# Patient Record
Sex: MALE | Race: WHITE | URBAN - METROPOLITAN AREA
[De-identification: names, ages, dates, MRNs, and addresses within clinical notes are randomized per-mention and may not be internally consistent; named-entity substitution may affect disease eponyms.]

---

## 2017-04-21 ENCOUNTER — NEW PATIENT (OUTPATIENT)
Dept: URBAN - METROPOLITAN AREA CLINIC 56 | Facility: CLINIC | Age: 72
End: 2017-04-21
Payer: COMMERCIAL

## 2017-04-21 PROCEDURE — 92004 COMPRE OPH EXAM NEW PT 1/>: CPT | Performed by: OPTOMETRIST

## 2017-04-21 ASSESSMENT — VISUAL ACUITY
OD: 20/25
OS: 20/20

## 2017-04-21 ASSESSMENT — KERATOMETRY
OD: 46.08
OS: 46.22

## 2017-04-21 ASSESSMENT — INTRAOCULAR PRESSURE
OS: 15
OD: 15

## 2017-07-25 ENCOUNTER — FOLLOW UP ESTABLISHED (OUTPATIENT)
Dept: URBAN - METROPOLITAN AREA CLINIC 56 | Facility: CLINIC | Age: 72
End: 2017-07-25
Payer: COMMERCIAL

## 2017-07-25 DIAGNOSIS — H25.813 COMBINED FORMS OF AGE-RELATED CATARACT, BILATERAL: Primary | ICD-10-CM

## 2017-07-25 PROCEDURE — 92004 COMPRE OPH EXAM NEW PT 1/>: CPT | Performed by: OPHTHALMOLOGY

## 2017-07-25 PROCEDURE — 92134 CPTRZ OPH DX IMG PST SGM RTA: CPT | Performed by: OPHTHALMOLOGY

## 2017-07-25 ASSESSMENT — INTRAOCULAR PRESSURE
OS: 20
OD: 19

## 2017-07-26 ENCOUNTER — FOLLOW UP ESTABLISHED (OUTPATIENT)
Dept: URBAN - METROPOLITAN AREA CLINIC 56 | Facility: CLINIC | Age: 72
End: 2017-07-26
Payer: COMMERCIAL

## 2017-07-26 PROCEDURE — 92014 COMPRE OPH EXAM EST PT 1/>: CPT | Performed by: OPTOMETRIST

## 2017-07-26 ASSESSMENT — VISUAL ACUITY
OS: 20/20
OD: 20/25

## 2017-07-26 ASSESSMENT — INTRAOCULAR PRESSURE
OS: 18
OD: 18

## 2017-07-26 ASSESSMENT — KERATOMETRY
OD: 46.09
OS: 46.09

## 2017-08-11 ENCOUNTER — Encounter (OUTPATIENT)
Dept: URBAN - METROPOLITAN AREA CLINIC 56 | Facility: CLINIC | Age: 72
End: 2017-08-11
Payer: COMMERCIAL

## 2017-08-11 DIAGNOSIS — Z01.818 ENCOUNTER FOR OTHER PREPROCEDURAL EXAMINATION: Primary | ICD-10-CM

## 2017-08-11 PROCEDURE — 99213 OFFICE O/P EST LOW 20 MIN: CPT | Performed by: PHYSICIAN ASSISTANT

## 2017-08-11 PROCEDURE — 92025 CPTRIZED CORNEAL TOPOGRAPHY: CPT | Performed by: OPHTHALMOLOGY

## 2017-08-11 RX ORDER — ASPIRIN 81 MG/1
81 MG TABLET ORAL
Qty: 0 | Refills: 0 | Status: INACTIVE
Start: 2017-08-11 | End: 2018-06-21

## 2017-08-11 RX ORDER — MULTIVITAMIN
TABLET ORAL
Qty: 0 | Refills: 0 | Status: INACTIVE
Start: 2017-08-11 | End: 2018-06-21

## 2017-08-15 ENCOUNTER — FOLLOW UP ESTABLISHED (OUTPATIENT)
Dept: URBAN - METROPOLITAN AREA CLINIC 44 | Facility: CLINIC | Age: 72
End: 2017-08-15
Payer: COMMERCIAL

## 2017-08-15 DIAGNOSIS — H25.811 COMBINED FORMS OF AGE-RELATED CATARACT, RIGHT EYE: Primary | ICD-10-CM

## 2017-08-15 DIAGNOSIS — H25.812 COMBINED FORMS OF AGE-RELATED CATARACT, LEFT EYE: ICD-10-CM

## 2017-08-15 PROCEDURE — 99213 OFFICE O/P EST LOW 20 MIN: CPT | Performed by: OPHTHALMOLOGY

## 2017-08-22 ENCOUNTER — SURGERY (OUTPATIENT)
Dept: URBAN - METROPOLITAN AREA SURGERY 19 | Facility: SURGERY | Age: 72
End: 2017-08-22
Payer: COMMERCIAL

## 2017-08-22 PROCEDURE — 66984 XCAPSL CTRC RMVL W/O ECP: CPT | Performed by: OPHTHALMOLOGY

## 2017-08-23 ENCOUNTER — POST OP (OUTPATIENT)
Dept: URBAN - METROPOLITAN AREA CLINIC 56 | Facility: CLINIC | Age: 72
End: 2017-08-23

## 2017-08-23 PROCEDURE — 99024 POSTOP FOLLOW-UP VISIT: CPT | Performed by: OPTOMETRIST

## 2017-08-23 ASSESSMENT — INTRAOCULAR PRESSURE: OD: 18

## 2017-08-30 ENCOUNTER — POST OP (OUTPATIENT)
Dept: URBAN - METROPOLITAN AREA CLINIC 56 | Facility: CLINIC | Age: 72
End: 2017-08-30

## 2017-08-30 PROCEDURE — 99024 POSTOP FOLLOW-UP VISIT: CPT | Performed by: OPTOMETRIST

## 2017-08-30 ASSESSMENT — VISUAL ACUITY
OS: 20/25
OD: 20/20

## 2017-08-30 ASSESSMENT — INTRAOCULAR PRESSURE
OS: 18
OD: 18

## 2017-09-05 ENCOUNTER — SURGERY (OUTPATIENT)
Dept: URBAN - METROPOLITAN AREA SURGERY 19 | Facility: SURGERY | Age: 72
End: 2017-09-05
Payer: COMMERCIAL

## 2017-09-05 PROCEDURE — 66984 XCAPSL CTRC RMVL W/O ECP: CPT | Performed by: OPHTHALMOLOGY

## 2017-09-06 ENCOUNTER — POST OP (OUTPATIENT)
Dept: URBAN - METROPOLITAN AREA CLINIC 56 | Facility: CLINIC | Age: 72
End: 2017-09-06

## 2017-09-06 PROCEDURE — 99024 POSTOP FOLLOW-UP VISIT: CPT | Performed by: OPTOMETRIST

## 2017-09-06 ASSESSMENT — INTRAOCULAR PRESSURE: OS: 10

## 2017-10-03 ENCOUNTER — POST OP (OUTPATIENT)
Dept: URBAN - METROPOLITAN AREA CLINIC 56 | Facility: CLINIC | Age: 72
End: 2017-10-03

## 2017-10-03 PROCEDURE — 92015 DETERMINE REFRACTIVE STATE: CPT | Performed by: OPTOMETRIST

## 2017-10-03 PROCEDURE — 99024 POSTOP FOLLOW-UP VISIT: CPT | Performed by: OPTOMETRIST

## 2017-10-03 RX ORDER — LATANOPROST 50 UG/ML
0.005 % SOLUTION OPHTHALMIC
Qty: 1 | Refills: 0 | Status: INACTIVE
Start: 2017-10-03 | End: 2018-06-20

## 2017-10-03 ASSESSMENT — VISUAL ACUITY
OD: 20/20
OS: 20/20

## 2017-10-03 ASSESSMENT — INTRAOCULAR PRESSURE
OS: 29
OD: 14

## 2018-06-21 ENCOUNTER — OFFICE VISIT (OUTPATIENT)
Dept: URBAN - METROPOLITAN AREA CLINIC 48 | Facility: CLINIC | Age: 73
End: 2018-06-21
Payer: COMMERCIAL

## 2018-06-21 PROCEDURE — 92012 INTRM OPH EXAM EST PATIENT: CPT | Performed by: OPHTHALMOLOGY

## 2018-06-21 ASSESSMENT — INTRAOCULAR PRESSURE
OD: 16
OS: 18

## 2018-06-21 NOTE — IMPRESSION/PLAN
Impression: Ocular hypertension, left eye: H40.052. Plan: Discussed and reviewed diagnosis with patient today, understood by patient, Ocular Hypertension, intraocular pressure at/close to target with medication. Continue with medication and observe, will continue to monitor.  Patient to continue Alphagan p bid os and dorzolamide-timolol bid os

## 2020-03-06 ENCOUNTER — FOLLOW UP ESTABLISHED (OUTPATIENT)
Dept: URBAN - METROPOLITAN AREA CLINIC 60 | Facility: CLINIC | Age: 75
End: 2020-03-06
Payer: COMMERCIAL

## 2020-03-06 DIAGNOSIS — Z96.1 PRESENCE OF INTRAOCULAR LENS: ICD-10-CM

## 2020-03-06 PROCEDURE — 92014 COMPRE OPH EXAM EST PT 1/>: CPT | Performed by: OPTOMETRIST

## 2020-03-06 PROCEDURE — 92133 CPTRZD OPH DX IMG PST SGM ON: CPT | Performed by: OPTOMETRIST

## 2020-03-06 ASSESSMENT — VISUAL ACUITY
OS: 20/25
OD: 20/20

## 2020-03-06 ASSESSMENT — INTRAOCULAR PRESSURE
OD: 15
OS: 18

## 2020-06-08 ENCOUNTER — FOLLOW UP ESTABLISHED (OUTPATIENT)
Dept: URBAN - METROPOLITAN AREA CLINIC 60 | Facility: CLINIC | Age: 75
End: 2020-06-08
Payer: COMMERCIAL

## 2020-06-08 DIAGNOSIS — H26.493 OTHER SECONDARY CATARACT, BILATERAL: Primary | ICD-10-CM

## 2020-06-08 PROCEDURE — 92014 COMPRE OPH EXAM EST PT 1/>: CPT | Performed by: OPHTHALMOLOGY

## 2020-06-08 ASSESSMENT — INTRAOCULAR PRESSURE
OS: 19
OD: 16

## 2020-06-09 ENCOUNTER — SURGERY (OUTPATIENT)
Dept: URBAN - METROPOLITAN AREA SURGERY 40 | Facility: SURGERY | Age: 75
End: 2020-06-09
Payer: COMMERCIAL

## 2020-06-09 ENCOUNTER — Encounter (OUTPATIENT)
Dept: URBAN - METROPOLITAN AREA SURGERY 39 | Facility: SURGERY | Age: 75
End: 2020-06-09
Payer: COMMERCIAL

## 2020-06-09 PROCEDURE — 66821 AFTER CATARACT LASER SURGERY: CPT | Performed by: OPHTHALMOLOGY

## 2020-06-19 ENCOUNTER — POST OP (OUTPATIENT)
Dept: URBAN - METROPOLITAN AREA CLINIC 60 | Facility: CLINIC | Age: 75
End: 2020-06-19

## 2020-06-19 PROCEDURE — 99024 POSTOP FOLLOW-UP VISIT: CPT | Performed by: OPTOMETRIST

## 2020-06-19 PROCEDURE — 92015 DETERMINE REFRACTIVE STATE: CPT | Performed by: OPTOMETRIST

## 2020-06-19 ASSESSMENT — INTRAOCULAR PRESSURE
OS: 18
OD: 16

## 2020-06-19 ASSESSMENT — VISUAL ACUITY
OD: 20/40
OS: 20/30

## 2020-10-09 ENCOUNTER — Encounter (OUTPATIENT)
Dept: URBAN - METROPOLITAN AREA CLINIC 60 | Facility: CLINIC | Age: 75
End: 2020-10-09
Payer: COMMERCIAL

## 2020-10-09 DIAGNOSIS — H02.413 MECHANICAL PTOSIS OF BILATERAL EYELIDS: Primary | ICD-10-CM

## 2020-10-09 PROCEDURE — 92083 EXTENDED VISUAL FIELD XM: CPT | Performed by: OPTOMETRIST

## 2021-07-08 ENCOUNTER — OFFICE VISIT (OUTPATIENT)
Dept: URBAN - METROPOLITAN AREA CLINIC 60 | Facility: CLINIC | Age: 76
End: 2021-07-08
Payer: MEDICARE

## 2021-07-08 DIAGNOSIS — H26.491 OTHER SECONDARY CATARACT, RIGHT EYE: ICD-10-CM

## 2021-07-08 DIAGNOSIS — H52.13 MYOPIA, BILATERAL: ICD-10-CM

## 2021-07-08 DIAGNOSIS — H02.831 DERMATOCHALASIS OF RIGHT UPPER LID: ICD-10-CM

## 2021-07-08 DIAGNOSIS — H02.834 DERMATOCHALASIS OF LEFT UPPER LID: ICD-10-CM

## 2021-07-08 DIAGNOSIS — H04.123 TEAR FILM INSUFFICIENCY OF BILATERAL LACRIMAL GLANDS: Primary | ICD-10-CM

## 2021-07-08 DIAGNOSIS — H43.813 VITREOUS DEGENERATION, BILATERAL: ICD-10-CM

## 2021-07-08 PROCEDURE — 92014 COMPRE OPH EXAM EST PT 1/>: CPT | Performed by: OPTOMETRIST

## 2021-07-08 RX ORDER — DIPHENHYDRAMINE HCL 25 MG
CAPSULE ORAL
Qty: 0 | Refills: 0 | Status: INACTIVE
Start: 2021-07-08 | End: 2021-08-23

## 2021-07-08 ASSESSMENT — INTRAOCULAR PRESSURE
OD: 15
OS: 22

## 2021-07-08 ASSESSMENT — VISUAL ACUITY
OS: 20/20
OD: 20/30

## 2021-07-08 NOTE — IMPRESSION/PLAN
Impression: Tear film insufficiency of bilateral lacrimal glands: H04.123. Plan: Diagnosis discussed with patient. Lissamine staining done today, negative OU. Recommend patient continue taking Omega 3 fatty acids (2-3,000 mg) daily and use artificial tears 4 times a day.

## 2021-07-08 NOTE — IMPRESSION/PLAN
Impression: Dermatochalasis of right upper lid: H02.831. *MRD1-OD-5.5/7 *MRD2-OS-6/8 Plan: Diagnosis discussed with the patient. Patient states that eyelids do interfere with their vision. Will have patient return to clinic for VF. Will proceed with Oculoplastic consult if they meet insurance guidelines.

## 2021-08-23 ENCOUNTER — OFFICE VISIT (OUTPATIENT)
Dept: URBAN - METROPOLITAN AREA CLINIC 48 | Facility: CLINIC | Age: 76
End: 2021-08-23
Payer: MEDICARE

## 2021-08-23 DIAGNOSIS — H40.052 OCULAR HYPERTENSION, LEFT EYE: Primary | ICD-10-CM

## 2021-08-23 PROCEDURE — 92133 CPTRZD OPH DX IMG PST SGM ON: CPT | Performed by: OPHTHALMOLOGY

## 2021-08-23 PROCEDURE — 92020 GONIOSCOPY: CPT | Performed by: OPHTHALMOLOGY

## 2021-08-23 PROCEDURE — 99204 OFFICE O/P NEW MOD 45 MIN: CPT | Performed by: OPHTHALMOLOGY

## 2021-08-23 PROCEDURE — 92083 EXTENDED VISUAL FIELD XM: CPT | Performed by: OPHTHALMOLOGY

## 2021-08-23 ASSESSMENT — INTRAOCULAR PRESSURE
OS: 21
OD: 15

## 2021-08-23 NOTE — IMPRESSION/PLAN
Impression: Ocular hypertension, left eye: H40.052. Plan: Discussed and reviewed diagnosis with patient today, understood by patient, Ocular Hypertension, intraocular pressure acceptable without medication. Continue without medication and observe, will continue to monitor. IOP check in 6 months.

## 2022-03-15 ENCOUNTER — OFFICE VISIT (OUTPATIENT)
Dept: URBAN - METROPOLITAN AREA CLINIC 48 | Facility: CLINIC | Age: 77
End: 2022-03-15
Payer: MEDICARE

## 2022-03-15 PROCEDURE — 99213 OFFICE O/P EST LOW 20 MIN: CPT | Performed by: OPHTHALMOLOGY

## 2022-03-15 ASSESSMENT — INTRAOCULAR PRESSURE
OD: 14
OS: 21

## 2022-03-15 NOTE — IMPRESSION/PLAN
Impression: Ocular hypertension, left eye: H40.052. Plan: IOP continues to be within good range OD, acceptable range OS. Continue to monitor off drops.

## 2022-07-11 ENCOUNTER — OFFICE VISIT (OUTPATIENT)
Dept: URBAN - METROPOLITAN AREA CLINIC 60 | Facility: CLINIC | Age: 77
End: 2022-07-11
Payer: COMMERCIAL

## 2022-07-11 DIAGNOSIS — H26.491 OTHER SECONDARY CATARACT, RIGHT EYE: ICD-10-CM

## 2022-07-11 DIAGNOSIS — H52.13 MYOPIA, BILATERAL: Primary | ICD-10-CM

## 2022-07-11 PROCEDURE — 92014 COMPRE OPH EXAM EST PT 1/>: CPT | Performed by: OPTOMETRIST

## 2022-07-11 ASSESSMENT — VISUAL ACUITY
OD: 20/40
OS: 20/25

## 2022-07-11 ASSESSMENT — INTRAOCULAR PRESSURE
OS: 17
OD: 14

## 2022-07-11 NOTE — IMPRESSION/PLAN
Impression: Other secondary cataract, right eye: H26.491. Plan: Patient's vision is limited by opacified capsule. Discussed YAG laser capsulotomy procedure. Patient wishes to proceed with YAG laser capsulotomy. Will schedule consult with Dr. Rosana Rich.

## 2022-09-19 ENCOUNTER — OFFICE VISIT (OUTPATIENT)
Dept: URBAN - METROPOLITAN AREA CLINIC 48 | Facility: CLINIC | Age: 77
End: 2022-09-19
Payer: MEDICARE

## 2022-09-19 DIAGNOSIS — H40.052 OCULAR HYPERTENSION, LEFT EYE: Primary | ICD-10-CM

## 2022-09-19 PROCEDURE — 92133 CPTRZD OPH DX IMG PST SGM ON: CPT | Performed by: OPHTHALMOLOGY

## 2022-09-19 PROCEDURE — 99214 OFFICE O/P EST MOD 30 MIN: CPT | Performed by: OPHTHALMOLOGY

## 2022-09-19 PROCEDURE — 92083 EXTENDED VISUAL FIELD XM: CPT | Performed by: OPHTHALMOLOGY

## 2022-09-19 ASSESSMENT — INTRAOCULAR PRESSURE
OD: 17
OS: 22

## 2022-09-19 NOTE — IMPRESSION/PLAN
Impression: Ocular hypertension, left eye: H40.052. Plan: Discussed and reviewed diagnosis with patient today, understood by patient, discussed reviewed VF and OCT with patient today, intraocular pressure stable without  medication. will continue to monitor.

## 2022-10-25 ENCOUNTER — OFFICE VISIT (OUTPATIENT)
Dept: URBAN - METROPOLITAN AREA CLINIC 48 | Facility: CLINIC | Age: 77
End: 2022-10-25
Payer: MEDICARE

## 2022-10-25 DIAGNOSIS — H10.45 OTHER CHRONIC ALLERGIC CONJUNCTIVITIS: Primary | ICD-10-CM

## 2022-10-25 PROCEDURE — 99213 OFFICE O/P EST LOW 20 MIN: CPT | Performed by: STUDENT IN AN ORGANIZED HEALTH CARE EDUCATION/TRAINING PROGRAM

## 2022-10-25 RX ORDER — NEOMYCIN SULFATE, POLYMYXIN B SULFATE AND DEXAMETHASONE 3.5; 10000; 1 MG/ML; [USP'U]/ML; MG/ML
SUSPENSION/ DROPS OPHTHALMIC
Qty: 5 | Refills: 1 | Status: ACTIVE
Start: 2022-10-25

## 2022-10-25 ASSESSMENT — INTRAOCULAR PRESSURE
OS: 16
OD: 14

## 2022-10-25 NOTE — IMPRESSION/PLAN
Impression: Other chronic allergic conjunctivitis: H10.45. Plan: Discussed nature of diagnoses, will start treatment today. Start:  MPD   TID OD  d/c after 1 week RTC precautions reviewed with patient.  

RTC as scheduled

## 2023-05-10 ENCOUNTER — OFFICE VISIT (OUTPATIENT)
Dept: URBAN - METROPOLITAN AREA CLINIC 48 | Facility: CLINIC | Age: 78
End: 2023-05-10
Payer: MEDICARE

## 2023-05-10 DIAGNOSIS — H01.009 BLEPHARITIS OF EYELID: ICD-10-CM

## 2023-05-10 DIAGNOSIS — H40.052 OCULAR HYPERTENSION, LEFT EYE: Primary | ICD-10-CM

## 2023-05-10 DIAGNOSIS — H26.491 OTHER SECONDARY CATARACT, RIGHT EYE: ICD-10-CM

## 2023-05-10 PROCEDURE — 99213 OFFICE O/P EST LOW 20 MIN: CPT | Performed by: OPHTHALMOLOGY

## 2023-05-10 ASSESSMENT — INTRAOCULAR PRESSURE
OD: 15
OS: 21

## 2023-05-10 NOTE — IMPRESSION/PLAN
Impression: Blepharitis of eyelid: H01.009. Plan:  Will refer to Dr Oskar Catherine for further management

## 2023-05-10 NOTE — IMPRESSION/PLAN
Impression: Ocular hypertension, left eye: H40.052. Plan: Intraocular pressure stable without  medication. will continue to monitor.

## 2023-05-10 NOTE — IMPRESSION/PLAN
Impression: Other secondary cataract, right eye: H26.491. Plan:  Discussed YAG laser capsulotomy procedure with patient, KYLE at this time.  


rtc 6 months YAG evaluation

## 2023-06-08 ENCOUNTER — OFFICE VISIT (OUTPATIENT)
Dept: URBAN - METROPOLITAN AREA CLINIC 48 | Facility: CLINIC | Age: 78
End: 2023-06-08
Payer: MEDICARE

## 2023-06-08 DIAGNOSIS — H26.491 OTHER SECONDARY CATARACT, RIGHT EYE: ICD-10-CM

## 2023-06-08 DIAGNOSIS — H53.2 DIPLOPIA: Primary | ICD-10-CM

## 2023-06-08 DIAGNOSIS — H35.3132 NONEXUDATIVE MACULAR DEGENERATION, INTERMEDIATE DRY STAGE, BILATERAL: ICD-10-CM

## 2023-06-08 PROCEDURE — 99214 OFFICE O/P EST MOD 30 MIN: CPT | Performed by: OPHTHALMOLOGY

## 2023-06-08 PROCEDURE — 92134 CPTRZ OPH DX IMG PST SGM RTA: CPT | Performed by: OPHTHALMOLOGY

## 2023-06-08 ASSESSMENT — INTRAOCULAR PRESSURE
OD: 15
OS: 17

## 2023-06-08 NOTE — IMPRESSION/PLAN
Impression: Nonexudative macular degeneration, intermediate dry stage, bilateral: H35.6605. Plan: OCT ordered and performed today. Discussed diagnosis in detail with patient. Discussed treatment options with patient. Discussed risks and benefits and patient understands. Macular degeneration, dry type with stable vision. Will continue to monitor vision and the patient has been instructed to call with any vision changes. MIKEL and AREDS2 discussed with patient.  

RTC 1yr DE OCT/MAC

## 2023-06-08 NOTE — IMPRESSION/PLAN
Impression: Other secondary cataract, right eye: H26.491. Plan: Does not appear to be visually significant, will monitor for now YAG Eval at next visit if patient is symptomatic

## 2023-06-08 NOTE — IMPRESSION/PLAN
Impression: Diplopia: H53.2. ACT
PG: XT flick LG: XT flick RG: ortho UG: ortho DG: ortho

unable to elicit double vision at the time of testing Plan: monocular diplopia episode, NO stroke like symptoms noticed If episodes are noted again, patient to confirm which eye it is and if it improves with AFT. If symptoms persist regardless of using AFT, patient to be seen same day. Patient to present to ER if diplopia along with stroke like symptoms are noted.  

RTC 2mo Follow up

## 2023-08-08 ENCOUNTER — OFFICE VISIT (OUTPATIENT)
Dept: URBAN - METROPOLITAN AREA CLINIC 48 | Facility: CLINIC | Age: 78
End: 2023-08-08
Payer: MEDICARE

## 2023-08-08 DIAGNOSIS — H53.2 DIPLOPIA: Primary | ICD-10-CM

## 2023-08-08 PROCEDURE — 99213 OFFICE O/P EST LOW 20 MIN: CPT | Performed by: OPHTHALMOLOGY

## 2023-08-08 ASSESSMENT — INTRAOCULAR PRESSURE
OD: 14
OS: 20

## 2023-10-16 ENCOUNTER — OFFICE VISIT (OUTPATIENT)
Dept: URBAN - METROPOLITAN AREA CLINIC 48 | Facility: CLINIC | Age: 78
End: 2023-10-16
Payer: MEDICARE

## 2023-10-16 DIAGNOSIS — H53.2 DIPLOPIA: Primary | ICD-10-CM

## 2023-10-16 PROCEDURE — 99213 OFFICE O/P EST LOW 20 MIN: CPT | Performed by: OPHTHALMOLOGY

## 2023-10-16 ASSESSMENT — INTRAOCULAR PRESSURE
OS: 18
OS: 19
OD: 12

## 2024-01-17 ENCOUNTER — OFFICE VISIT (OUTPATIENT)
Dept: URBAN - METROPOLITAN AREA CLINIC 48 | Facility: CLINIC | Age: 79
End: 2024-01-17
Payer: MEDICARE

## 2024-01-17 DIAGNOSIS — H40.052 OCULAR HYPERTENSION, LEFT EYE: Primary | ICD-10-CM

## 2024-01-17 DIAGNOSIS — H01.009 BLEPHARITIS OF EYELID: ICD-10-CM

## 2024-01-17 DIAGNOSIS — H26.491 OTHER SECONDARY CATARACT, RIGHT EYE: ICD-10-CM

## 2024-01-17 PROCEDURE — 92133 CPTRZD OPH DX IMG PST SGM ON: CPT | Performed by: OPHTHALMOLOGY

## 2024-01-17 PROCEDURE — 99214 OFFICE O/P EST MOD 30 MIN: CPT | Performed by: OPHTHALMOLOGY

## 2024-01-17 PROCEDURE — 92083 EXTENDED VISUAL FIELD XM: CPT | Performed by: OPHTHALMOLOGY

## 2024-01-17 ASSESSMENT — INTRAOCULAR PRESSURE
OS: 25
OD: 15

## 2024-05-13 ENCOUNTER — PROCEDURE (OUTPATIENT)
Dept: URBAN - METROPOLITAN AREA SURGERY 24 | Facility: SURGERY | Age: 79
End: 2024-05-13
Payer: MEDICARE

## 2024-05-13 ENCOUNTER — Encounter (OUTPATIENT)
Dept: URBAN - METROPOLITAN AREA SURGERY 25 | Facility: SURGERY | Age: 79
End: 2024-05-13
Payer: MEDICARE

## 2024-05-13 PROCEDURE — 66821 AFTER CATARACT LASER SURGERY: CPT | Performed by: OPHTHALMOLOGY

## 2024-05-20 ENCOUNTER — POST-OPERATIVE VISIT (OUTPATIENT)
Dept: URBAN - METROPOLITAN AREA CLINIC 48 | Facility: CLINIC | Age: 79
End: 2024-05-20
Payer: MEDICARE

## 2024-05-20 DIAGNOSIS — Z48.810 ENCOUNTER FOR SURGICAL AFTERCARE FOLLOWING SURGERY ON A SENSE ORGAN: Primary | ICD-10-CM

## 2024-05-20 PROCEDURE — 99024 POSTOP FOLLOW-UP VISIT: CPT | Performed by: OPHTHALMOLOGY

## 2024-05-20 ASSESSMENT — INTRAOCULAR PRESSURE
OD: 15
OS: 22

## 2024-11-13 ENCOUNTER — OFFICE VISIT (OUTPATIENT)
Dept: URBAN - METROPOLITAN AREA CLINIC 48 | Facility: CLINIC | Age: 79
End: 2024-11-13
Payer: MEDICARE

## 2024-11-13 DIAGNOSIS — H40.052 OCULAR HYPERTENSION, LEFT EYE: Primary | ICD-10-CM

## 2024-11-13 DIAGNOSIS — H35.3132 NONEXUDATIVE MACULAR DEGENERATION, INTERMEDIATE DRY STAGE, BILATERAL: ICD-10-CM

## 2024-11-13 PROCEDURE — 99214 OFFICE O/P EST MOD 30 MIN: CPT | Performed by: OPHTHALMOLOGY

## 2024-11-13 ASSESSMENT — INTRAOCULAR PRESSURE
OD: 11
OS: 19

## 2024-11-20 ENCOUNTER — OFFICE VISIT (OUTPATIENT)
Dept: URBAN - METROPOLITAN AREA CLINIC 48 | Facility: CLINIC | Age: 79
End: 2024-11-20
Payer: MEDICARE

## 2024-11-20 DIAGNOSIS — H35.3132 NONEXUDATIVE MACULAR DEGENERATION, INTERMEDIATE DRY STAGE, BILATERAL: ICD-10-CM

## 2024-11-20 DIAGNOSIS — H40.052 OCULAR HYPERTENSION, LEFT EYE: Primary | ICD-10-CM

## 2024-11-20 PROCEDURE — 99213 OFFICE O/P EST LOW 20 MIN: CPT | Performed by: OPHTHALMOLOGY

## 2024-11-20 ASSESSMENT — INTRAOCULAR PRESSURE
OS: 23
OD: 17

## 2025-01-20 ENCOUNTER — OFFICE VISIT (OUTPATIENT)
Dept: URBAN - METROPOLITAN AREA CLINIC 48 | Facility: CLINIC | Age: 80
End: 2025-01-20
Payer: MEDICARE

## 2025-01-20 DIAGNOSIS — H20.012 PRIMARY IRIDOCYCLITIS, LEFT EYE: Primary | ICD-10-CM

## 2025-01-20 PROCEDURE — 99204 OFFICE O/P NEW MOD 45 MIN: CPT | Performed by: OPTOMETRIST

## 2025-01-20 RX ORDER — PREDNISOLONE ACETATE 10 MG/ML
1 % SUSPENSION/ DROPS OPHTHALMIC
Qty: 5 | Refills: 1 | Status: ACTIVE
Start: 2025-01-20

## 2025-01-20 ASSESSMENT — INTRAOCULAR PRESSURE
OS: 23
OD: 18

## 2025-01-27 ENCOUNTER — OFFICE VISIT (OUTPATIENT)
Dept: URBAN - METROPOLITAN AREA CLINIC 48 | Facility: CLINIC | Age: 80
End: 2025-01-27
Payer: MEDICARE

## 2025-01-27 DIAGNOSIS — H20.012 PRIMARY IRIDOCYCLITIS, LEFT EYE: Primary | ICD-10-CM

## 2025-01-27 PROCEDURE — 99213 OFFICE O/P EST LOW 20 MIN: CPT | Performed by: OPTOMETRIST

## 2025-01-27 ASSESSMENT — INTRAOCULAR PRESSURE
OS: 17
OD: 12